# Patient Record
Sex: FEMALE | Race: BLACK OR AFRICAN AMERICAN
[De-identification: names, ages, dates, MRNs, and addresses within clinical notes are randomized per-mention and may not be internally consistent; named-entity substitution may affect disease eponyms.]

---

## 2019-12-15 ENCOUNTER — HOSPITAL ENCOUNTER (EMERGENCY)
Dept: HOSPITAL 63 - ER | Age: 39
Discharge: HOME | End: 2019-12-15
Payer: COMMERCIAL

## 2019-12-15 VITALS — SYSTOLIC BLOOD PRESSURE: 113 MMHG | DIASTOLIC BLOOD PRESSURE: 71 MMHG

## 2019-12-15 VITALS — BODY MASS INDEX: 22.15 KG/M2 | WEIGHT: 125 LBS | HEIGHT: 63 IN

## 2019-12-15 DIAGNOSIS — Z98.818: ICD-10-CM

## 2019-12-15 DIAGNOSIS — K08.89: ICD-10-CM

## 2019-12-15 DIAGNOSIS — G89.18: Primary | ICD-10-CM

## 2019-12-15 PROCEDURE — 96375 TX/PRO/DX INJ NEW DRUG ADDON: CPT

## 2019-12-15 PROCEDURE — 99284 EMERGENCY DEPT VISIT MOD MDM: CPT

## 2019-12-15 PROCEDURE — 96365 THER/PROPH/DIAG IV INF INIT: CPT

## 2019-12-15 PROCEDURE — 96372 THER/PROPH/DIAG INJ SC/IM: CPT

## 2019-12-15 NOTE — PHYS DOC
Adult General


Chief Complaint


Chief Complaint:  FACE PROBLEM.. " .. I had a root canal on thursday.. and they 

were to give me some antibiotics.. but I didnt get them.. now... my face is 

swollen..."





HPI


HPI





Patient is a 39 year old female who presents with above hx and complaints right 

facial edema and swelling and pain after a root canal on Thursday.  Patient was 

to be on Augmentin for infection however prescription was never issued.   

Patient relates sensitivities will canal her entire side of right face has 

become swollen, red and increased pain right mandible. Patient was issued a 

prescription for Augmentin.  Patient does have a follow-up appointment on 

Tuesday this coming week Dr. Howard.   Patient denies any history of diabetes. 

Patient has history of immunosuppression. Denies specific ill contacts. Patient 

has no trismus. No edema to the soft tissue under tongue and neck. Patient does 

have obvious swelling over the entire right side of face. There is some 

adenopathy at Rt. angle of mandible.  Patient currently rating her pain as 9 

tyo10 out of 10.





Review of Systems


Review of Systems





Constitutional: Subjective symptoms of fever


Eyes: Denies change in visual acuity, redness, or eye pain []


HENT: Denies nasal congestion or sore throat [. Patient has]right face 

tenderness and swelling. Dental pain.


Respiratory: Denies cough or shortness of breath []


Cardiovascular: No additional information not addressed in HPI []


GI: Denies abdominal pain, nausea, vomiting, bloody stools or diarrhea []


: Denies dysuria or hematuria []


Musculoskeletal: Denies back pain or joint pain []


Integument: Denies rash or skin lesions []


Neurologic: Denies headache, focal weakness or sensory changes []


Endocrine: Denies polyuria or polydipsia []





All other systems were reviewed and found to be within normal limits, except as 

documented in this note.





Family History


Family History


Noncontributory





Current Medications


Current Medications


See nursing for home meds





Allergies


Allergies


No known drug allergies





Physical Exam


Physical Exam





Constitutional: in acute distress, non-toxic appearance. []


HENT: Normocephalic, atraumatic, bilateral external ears normal, oropharynx 

moist, no oral exudates, nose normal. Entire right facial edema and erythema.  

Recent dental work findings on molars right.  Adenopathy angle of mandible right


Eyes: PERRLA, EOMI, conjunctiva normal, no discharge. [] 


Neck: Normal range of motion, no tenderness, supple, no stridor. [] 


Cardiovascular:Heart rate regular rhythm, no murmur []


Lungs & Thorax:  Bilateral breath sounds equal at apex auscultation []


Abdomen: Bowel sounds normal, soft, no tenderness, no masses, no pulsatile 

masses. [] 


Skin: Warm, dry, no erythema, no rash. [] 


Back: No tenderness, no CVA tenderness. [] 


Extremities: No tenderness, no cyanosis, no clubbing, ROM intact, no edema. [] 


Neurologic: Alert and oriented X 3, normal motor function, normal sensory 

function, no focal deficits noted. []


Psychologic: Affect anxious, judgement normal, mood tearful.





EKG


EKG


[]





Radiology/Procedures


Radiology/Procedures


[]





Course & Med Decision Making


Course & Med Decision Making


Pertinent Labs and Imaging studies reviewed. (See chart for details)





Keep follow up with Dentist.  Patient continue current antibiotics Augmentin and

 Add Flagyl 500 three times a day for infection.   Tylenol and Ibuprofen for 

pain.  Ice packs.  Marked pain take Vicoprofen- with narcotic precaution. . 








Impression:





1. Dental Pain


2. Facial Cellulitis / Abscess





[]





Dragon Disclaimer


Dragon Disclaimer


This electronic medical record was generated, in whole or in part, using a voice

 recognition dictation system.





Departure


Departure:


Disposition:  01 HOME/RESIDENCE PRIOR TO ADM


Condition:  STABLE


Referrals:  


PCP,NO (PCP)


Scripts


Ondansetron Hcl (ZOFRAN) 8 Mg Tablet


8 MG PO QIDPRN PRN for acute vomiting, #30 BOTTLE


   Prov: AIRAM ANTONY MD         12/15/19 


Metronidazole (FLAGYL) 500 Mg Tablet


500 MG PO TID for cellulitis and abscess for 10 Days, #30 TAB


   Prov: AIRAM ANTONY MD         12/15/19 


Hydrocodone/Ibuprofen (HYDROCODONE-IBUPROFEN 7.5-200  **) 1 Each Tablet


1 TAB PO PRN Q6HRS PRN for PAIN, #30 TAB 0 Refills


   Prov: AIRAM ANTONY MD         12/15/19





Dragon Disclaimer


This chart was dictated in whole or in part using Voice Recognition software in 

a busy, high-work load, and often noisy Emergency Department environment.  It 

may contain unintended and wholly unrecognized errors or omissions.











AIRAM ANTONY MD           Dec 15, 2019 19:34